# Patient Record
Sex: MALE | Race: WHITE | NOT HISPANIC OR LATINO
[De-identification: names, ages, dates, MRNs, and addresses within clinical notes are randomized per-mention and may not be internally consistent; named-entity substitution may affect disease eponyms.]

---

## 2017-06-12 ENCOUNTER — APPOINTMENT (OUTPATIENT)
Dept: GASTROENTEROLOGY | Facility: CLINIC | Age: 62
End: 2017-06-12

## 2017-06-12 VITALS
DIASTOLIC BLOOD PRESSURE: 86 MMHG | HEIGHT: 72 IN | TEMPERATURE: 98.8 F | WEIGHT: 234 LBS | SYSTOLIC BLOOD PRESSURE: 120 MMHG | BODY MASS INDEX: 31.69 KG/M2

## 2017-06-12 DIAGNOSIS — I71.2 THORACIC AORTIC ANEURYSM, W/OUT RUPTURE: ICD-10-CM

## 2017-06-12 DIAGNOSIS — Z82.49 FAMILY HISTORY OF ISCHEMIC HEART DISEASE AND OTHER DISEASES OF THE CIRCULATORY SYSTEM: ICD-10-CM

## 2017-06-12 DIAGNOSIS — Z82.3 FAMILY HISTORY OF STROKE: ICD-10-CM

## 2017-06-12 DIAGNOSIS — Z82.5 FAMILY HISTORY OF ASTHMA AND OTHER CHRONIC LOWER RESPIRATORY DISEASES: ICD-10-CM

## 2017-06-12 DIAGNOSIS — Z00.00 ENCOUNTER FOR GENERAL ADULT MEDICAL EXAMINATION W/OUT ABNORMAL FINDINGS: ICD-10-CM

## 2017-06-12 DIAGNOSIS — Z80.8 FAMILY HISTORY OF MALIGNANT NEOPLASM OF OTHER ORGANS OR SYSTEMS: ICD-10-CM

## 2017-06-12 DIAGNOSIS — Z87.442 PERSONAL HISTORY OF URINARY CALCULI: ICD-10-CM

## 2017-06-12 DIAGNOSIS — Z86.79 PERSONAL HISTORY OF OTHER DISEASES OF THE CIRCULATORY SYSTEM: ICD-10-CM

## 2017-06-12 DIAGNOSIS — Z87.891 PERSONAL HISTORY OF NICOTINE DEPENDENCE: ICD-10-CM

## 2017-06-12 RX ORDER — RAMIPRIL 10 MG/1
10 CAPSULE ORAL DAILY
Refills: 0 | Status: ACTIVE | COMMUNITY

## 2017-06-12 RX ORDER — CHOLECALCIFEROL (VITAMIN D3) 25 MCG
TABLET ORAL
Refills: 0 | Status: ACTIVE | COMMUNITY

## 2017-07-13 ENCOUNTER — APPOINTMENT (OUTPATIENT)
Dept: GASTROENTEROLOGY | Facility: AMBULATORY MEDICAL SERVICES | Age: 62
End: 2017-07-13

## 2017-08-01 ENCOUNTER — APPOINTMENT (OUTPATIENT)
Dept: GASTROENTEROLOGY | Facility: CLINIC | Age: 62
End: 2017-08-01
Payer: COMMERCIAL

## 2017-08-01 VITALS
TEMPERATURE: 98.6 F | WEIGHT: 235 LBS | HEIGHT: 72 IN | BODY MASS INDEX: 31.83 KG/M2 | DIASTOLIC BLOOD PRESSURE: 90 MMHG | OXYGEN SATURATION: 95 % | HEART RATE: 89 BPM | SYSTOLIC BLOOD PRESSURE: 138 MMHG

## 2017-08-01 PROCEDURE — 99213 OFFICE O/P EST LOW 20 MIN: CPT

## 2018-07-23 PROBLEM — Z80.8 FAMILY HISTORY OF SKIN CANCER: Status: ACTIVE | Noted: 2017-06-12

## 2019-01-11 ENCOUNTER — APPOINTMENT (OUTPATIENT)
Dept: RADIOLOGY | Facility: IMAGING CENTER | Age: 64
End: 2019-01-11
Payer: COMMERCIAL

## 2019-01-11 ENCOUNTER — OUTPATIENT (OUTPATIENT)
Dept: OUTPATIENT SERVICES | Facility: HOSPITAL | Age: 64
LOS: 1 days | End: 2019-01-11
Payer: COMMERCIAL

## 2019-01-11 DIAGNOSIS — Z00.8 ENCOUNTER FOR OTHER GENERAL EXAMINATION: ICD-10-CM

## 2019-01-11 PROCEDURE — 71046 X-RAY EXAM CHEST 2 VIEWS: CPT

## 2019-01-11 PROCEDURE — 71046 X-RAY EXAM CHEST 2 VIEWS: CPT | Mod: 26

## 2019-02-17 ENCOUNTER — OUTPATIENT (OUTPATIENT)
Dept: OUTPATIENT SERVICES | Facility: HOSPITAL | Age: 64
LOS: 1 days | End: 2019-02-17
Payer: COMMERCIAL

## 2019-02-17 ENCOUNTER — APPOINTMENT (OUTPATIENT)
Dept: MRI IMAGING | Facility: IMAGING CENTER | Age: 64
End: 2019-02-17
Payer: COMMERCIAL

## 2019-02-17 DIAGNOSIS — R94.5 ABNORMAL RESULTS OF LIVER FUNCTION STUDIES: ICD-10-CM

## 2019-02-17 PROCEDURE — 74183 MRI ABD W/O CNTR FLWD CNTR: CPT | Mod: 26

## 2019-02-17 PROCEDURE — 74183 MRI ABD W/O CNTR FLWD CNTR: CPT

## 2019-02-17 PROCEDURE — A9585: CPT

## 2019-04-02 ENCOUNTER — LABORATORY RESULT (OUTPATIENT)
Age: 64
End: 2019-04-02

## 2019-04-02 ENCOUNTER — APPOINTMENT (OUTPATIENT)
Dept: GASTROENTEROLOGY | Facility: CLINIC | Age: 64
End: 2019-04-02
Payer: COMMERCIAL

## 2019-04-02 VITALS
HEIGHT: 72 IN | BODY MASS INDEX: 32.91 KG/M2 | SYSTOLIC BLOOD PRESSURE: 126 MMHG | OXYGEN SATURATION: 97 % | WEIGHT: 243 LBS | DIASTOLIC BLOOD PRESSURE: 90 MMHG | HEART RATE: 85 BPM | TEMPERATURE: 97.5 F

## 2019-04-02 DIAGNOSIS — D12.3 BENIGN NEOPLASM OF TRANSVERSE COLON: ICD-10-CM

## 2019-04-02 DIAGNOSIS — R53.83 OTHER FATIGUE: ICD-10-CM

## 2019-04-02 PROCEDURE — 36415 COLL VENOUS BLD VENIPUNCTURE: CPT

## 2019-04-02 PROCEDURE — 99214 OFFICE O/P EST MOD 30 MIN: CPT | Mod: 25

## 2019-04-02 RX ORDER — SODIUM SULFATE, POTASSIUM SULFATE, MAGNESIUM SULFATE 17.5; 3.13; 1.6 G/ML; G/ML; G/ML
17.5-3.13-1.6 SOLUTION, CONCENTRATE ORAL
Qty: 1 | Refills: 0 | Status: DISCONTINUED | COMMUNITY
Start: 2017-06-12 | End: 2019-04-02

## 2019-04-03 NOTE — CONSULT LETTER
[FreeTextEntry1] : Dear Dr. Macias and Dr. Clarence Mike,\par \par I had the pleasure of seeing your patient GUNJAN LOYD in the office today.  My office note is attached.\par \par Thank you very much for allowing me to participate in the care of your patient.\par \par Sincerely,\par \par Ty Pierre M.D., FACG, FACP\par Director, Celiac Program at [facility]\par  of Medicine\par [employer] at [facility] Practice Director,\par [facility] - Gastroenterology/Internal Medicine at [place]\[facility] [address]\[place] [address]\par Tel: [phone]\par Email: [email or web address]

## 2019-04-03 NOTE — ASSESSMENT
[FreeTextEntry1] : Patient with elevated liver tests with an MRI showing fatty liver along with a subcentimeter indeterminate focus. He has a history of colonic polyps.\par \par Blood work was sent for LFTs, PT, alpha-1 antitrypsin, alpha-fetoprotein, SARAI, ANCA, ceruloplasmin, iron studies, GGTP, celiac markers, hepatitis A., B., C. serologies, lipid profile, AMA, anti-smooth muscle antibody, anti-LKM antibody, anti-soluble liver antigen antibody, and AGUILAR Fibrosure.\par \par We will repeat an MRI in August to followup the indeterminate focus seen.\par \par The patient was advised to stay on a low fat and low carbohydrate diet and to try to lose weight and increase exercise.\par \par Patient will return to see me in 2 months.\par \par Patient is due for colonoscopy in July 2020.\par \par \par Plan from 8/1/17 - Patient status post colonoscopy significant for tubular adenoma and mild diverticulosis. He has occasional urgency with his bowel movements but is otherwise well.\par \par Information was given to the patient regarding diverticulosis and a high-fiber diet.\par \par We will repeat a colonoscopy in 3 years that is July 2020.\par \par Patient will contact me if his fecal urgency continues or worsens.

## 2019-04-12 LAB
A1AT SERPL-MCNC: 72 MG/DL
AFP-TM SERPL-MCNC: 2.5 NG/ML
ALBUMIN SERPL ELPH-MCNC: 4.6 G/DL
ALP BLD-CCNC: 71 U/L
ALT SERPL-CCNC: 65 U/L
ANA PAT FLD IF-IMP: ABNORMAL
ANA SER IF-ACNC: ABNORMAL
AST SERPL W P-5'-P-CCNC: 51 IU/L
AST SERPL-CCNC: 45 U/L
BILIRUB DIRECT SERPL-MCNC: 0.2 MG/DL
BILIRUB INDIRECT SERPL-MCNC: 0.5 MG/DL
BILIRUB SERPL-MCNC: 0.7 MG/DL
CERULOPLASMIN SERPL-MCNC: 22 MG/DL
CHOLEST SERPL-MCNC: 182 MG/DL
CHOLEST SERPL-MCNC: 182 MG/DL
CHOLEST/HDLC SERPL: 5.1 RATIO
COMMENT:: NORMAL
FERRITIN SERPL-MCNC: 384 NG/ML
FIBROSIS STAGE SERPL QL: NORMAL
FIBROSURE ALPHA 2 MACROGLOBULINS: 312 MG/DL
FIBROSURE ALT (SGPT): 73 IU/L
FIBROSURE APOLIPOPROTEIN A1: 130 MG/DL
FIBROSURE GGT: 39 IU/L
FIBROSURE HAPTOGLOBIN: 90 MG/DL
FIBROSURE SCORING: NORMAL
FIBROSURE TOTAL BILIRUBIN: 0.6 MG/DL
GGT SERPL-CCNC: 43 U/L
GLIADIN IGA SER QL: <5 UNITS
GLIADIN IGG SER QL: <5 UNITS
GLIADIN PEPTIDE IGA SER-ACNC: NEGATIVE
GLIADIN PEPTIDE IGG SER-ACNC: NEGATIVE
GLUCOSE SERPL-MCNC: 116 MG/DL
HBV CORE IGG+IGM SER QL: NONREACTIVE
HBV SURFACE AB SER QL: NONREACTIVE
HBV SURFACE AG SER QL: NONREACTIVE
HCV AB SER QL: NONREACTIVE
HCV S/CO RATIO: 0.07 S/CO
HDLC SERPL-MCNC: 36 MG/DL
HEPATITIS A IGG ANTIBODY: NONREACTIVE
IGA SER QL IEP: 133 MG/DL
IGG SER QL IEP: 854 MG/DL
INR PPP: 0.97 RATIO
INTERPRETATIONS:: NORMAL
IRON SATN MFR SERPL: 30 %
IRON SERPL-MCNC: 106 UG/DL
LDLC SERPL CALC-MCNC: 81 MG/DL
LIVER FIBR SCORE SERPL CALC.FIBROSURE: 0.65
LKM AB SER QL IF: <20.1 UNITS
MITOCHONDRIA AB SER IF-ACNC: NORMAL
MPO AB + PR3 PNL SER: NORMAL
NASH SCORING: NORMAL
NECROINFLAMMATORY ACT GRADE SERPL QL: NORMAL
NECROINFLAMMATORY ACT SCORE SERPL: 0.75 (ref 0.25–?)
PROT SERPL-MCNC: 7.4 G/DL
PT BLD: 11.2 SEC
SERVICE CMNT-IMP: NORMAL
SMOOTH MUSCLE AB SER QL IF: ABNORMAL
SOLUBLE LIVER IGG SER IA-ACNC: < 20.1 UNITS
STEATOSIS GRADE: NORMAL
STEATOSIS GRADING: NORMAL
STEATOSIS SCORE: 0.73
TIBC SERPL-MCNC: 348 UG/DL
TRIGL SERPL-MCNC: 323 MG/DL
TRIGL SERPL-MCNC: 326 MG/DL
TTG IGA SER IA-ACNC: <1.2 U/ML
TTG IGA SER-ACNC: NEGATIVE
TTG IGG SER IA-ACNC: 7.3 U/ML
TTG IGG SER IA-ACNC: ABNORMAL
UIBC SERPL-MCNC: 242 UG/DL

## 2019-07-23 ENCOUNTER — APPOINTMENT (OUTPATIENT)
Dept: GASTROENTEROLOGY | Facility: CLINIC | Age: 64
End: 2019-07-23
Payer: COMMERCIAL

## 2019-07-23 VITALS
HEIGHT: 72 IN | SYSTOLIC BLOOD PRESSURE: 140 MMHG | TEMPERATURE: 98.7 F | BODY MASS INDEX: 32.78 KG/M2 | HEART RATE: 95 BPM | DIASTOLIC BLOOD PRESSURE: 90 MMHG | WEIGHT: 242 LBS | OXYGEN SATURATION: 95 %

## 2019-07-23 PROCEDURE — 36415 COLL VENOUS BLD VENIPUNCTURE: CPT

## 2019-07-23 PROCEDURE — 99215 OFFICE O/P EST HI 40 MIN: CPT | Mod: 25

## 2019-07-23 NOTE — HISTORY OF PRESENT ILLNESS
[FreeTextEntry1] : We spent a great deal of time going over the patient's blood work from April 2, 2019. AGUILAR Fibrosure showed S3, N2, F3 disease indicating the presence of AGUILAR with significant fibrosis. The patient's AST and ALT were elevated at 45 and 65 respectively. Alpha one antitrypsin was decreased at 72. SARAI was elevated at 1-320 with an anti-smooth muscle antibody that was borderline at 1-20. IgG was normal. Triglyceride was elevated at 323 although this was not a fasting study. Celiac markers were largely negative except for a weakly positive tissue transglutaminase IgG. Previous MRI showed a subcentimeter indeterminate focus in the liver.\par \par The patient denies abdominal pain. His bowel movements are stable. He generally has 2 sour bowel movements a day although he has occasional diarrhea. He denies melena or bright blood per rectum. The patient's weight is stable.\par \par \par Note from 4/2/19 - The patient has a history of colonic polyps and diverticulosis. He reports that he was found to have elevated liver tests and subsequently had an MRI on February 17, 2019 which showed moderate to severe hepatic steatosis along with a subcentimeter indeterminate focus in the right hepatic lobe for which six-month followup was recommended. The patient complains of fatigue but denies abdominal pain, heartburn, dysphagia. Bowel movements are normal without melena or prior blood per rectum. The patient has gained 8 pounds over the past 1-1/2 years.\par \par \par Note from 8/1/17 - The patient is status post colonoscopy performed in July 13, 2017. Examination was significant for removal of a tubular adenoma, along with mild diverticulosis and internal hemorrhoids which are asymptomatic. The patient has 2 bowel movements a day which are usually solid. He does note urgency with loose stools approximately twice a week. He denies melena or bright or blood per rectum. He denies heartburn or dysphagia. His weight is stable. Review of systems is negative.\par \par Note from 6/12/17 - Patient has a history of colonic polyps. He had multiple polyps removed on his previous colonoscopy. He feels well denying abdominal pain or chest pain. His stools are occasionally loose. He denies melena or bright red blood per rectum. Patient does not have heartburn or dysphagia. His weight is stable.\par \par The patient has a 4.7 cm thoracic aneurysm which is followed by cardiology and cardiothoracic surgery. The patient has not been hospitalized in the past year and denies any other cardiac issues.

## 2019-07-23 NOTE — CONSULT LETTER
[FreeTextEntry1] : Dear Dr. Macias and Dr. Clarence Mike,\par \par I had the pleasure of seeing your patient GUNJAN LOYD in the office today.  My office note is attached.\par \par Thank you very much for allowing me to participate in the care of your patient.\par \par Sincerely,\par \par Ty Pierre M.D., FAC, FACP\par Director, Celiac Program at [facility]  of Medicine\par [place] [employer] at [facility] Practice Director,\par [facility] - Gastroenterology/Internal Medicine at [place]\[facility] [address]\[place] [address]\par Tel: [phone]\par Email: [email or web address] \par \par \par The attached note has been created using a voice recognition system ([email or web address]).  There may be some misspellings and typos.  Please call my office if you have any issues or questions.

## 2019-07-23 NOTE — PHYSICAL EXAM
[General Appearance - Alert] : alert [General Appearance - In No Acute Distress] : in no acute distress [Neck Appearance] : the appearance of the neck was normal [Jugular Venous Distention Increased] : there was no jugular-venous distention [Neck Cervical Mass (___cm)] : no neck mass was observed [Thyroid Diffuse Enlargement] : the thyroid was not enlarged [Thyroid Nodule] : there were no palpable thyroid nodules [Auscultation Breath Sounds / Voice Sounds] : lungs were clear to auscultation bilaterally [Heart Rate And Rhythm] : heart rate was normal and rhythm regular [Heart Sounds Gallop] : no gallops [Heart Sounds] : normal S1 and S2 [Murmurs] : no murmurs [Heart Sounds Pericardial Friction Rub] : no pericardial rub [Abdomen Soft] : soft [Bowel Sounds] : normal bowel sounds [Edema] : there was no peripheral edema [] : no hepato-splenomegaly [Abdomen Tenderness] : non-tender [Abdomen Mass (___ Cm)] : no abdominal mass palpated [No CVA Tenderness] : no ~M costovertebral angle tenderness [Oriented To Time, Place, And Person] : oriented to person, place, and time [No Spinal Tenderness] : no spinal tenderness [Impaired Insight] : insight and judgment were intact [Affect] : the affect was normal

## 2019-07-23 NOTE — ASSESSMENT
[FreeTextEntry1] : Patient with evidence of AGUILAR and significant fibrosis. He has elevated transaminases. He has an elevated SARIA with a borderline anti-smooth muscle antibody and normal IgG. It is unlikely that this represents autoimmune hepatitis although it needs to be considered. The patient has a decreased alpha-1 antitrypsin level. Again, it is unlikely that the patient has alpha-1 antitrypsin deficiency as a cause of liver disease but it needs to be considered. The patient has a weakly positive tissue transglutaminase IgG but otherwise negative celiac studies. This is not consistent with the diagnosis of celiac disease. The patient also has elevated triglycerides. Previous MRI showed a subcentimeter indeterminant focus in the liver.\par \par Blood work was sent for one antitrypsin phenotype and genotype, celiac genetic testing, repeat SARAI, AFP, LFTs, GGTP.\par \par Patient was sent for an MRI to followup the previously seen indeterminant focus.\par \par Given the possibility of cirrhosis or advanced fibrosis and the slight questions as to any other liver diseases including autoimmune hepatitis and alpha-1 antitrypsin deficiency, the patient will be referred for a hepatology evaluation. He will need a fibroscan, which they will be able to perform.\par \par The patient was advised to stay on a low fat and low carbohydrate diet and to try to lose weight and increase exercise, as well as to avoid alcohol.\par \par The patient will discuss with his PMD regarding the possibility of starting medication for the elevated triglycerides. This has been discussed in the past.\par \par Patient is due for a colonoscopy in July 2020 for his history of colonic polyps. I will perform an EGD at the same time to fully exclude celiac disease.\par \par Patient will return to see me in 3 months.\par \par \par Plan from 4/2/19 - Patient with elevated liver tests with an MRI showing fatty liver along with a subcentimeter indeterminate focus. He has a history of colonic polyps.\par \par Blood work was sent for LFTs, PT, alpha-1 antitrypsin, alpha-fetoprotein, SARAI, ANCA, ceruloplasmin, iron studies, GGTP, celiac markers, hepatitis A., B., C. serologies, lipid profile, AMA, anti-smooth muscle antibody, anti-LKM antibody, anti-soluble liver antigen antibody, and AGUILAR Fibrosure.\par \par We will repeat an MRI in August to followup the indeterminate focus seen.\par \par The patient was advised to stay on a low fat and low carbohydrate diet and to try to lose weight and increase exercise.\par \par Patient will return to see me in 2 months.\par \par Patient is due for colonoscopy in July 2020.\par \par \par Plan from 8/1/17 - Patient status post colonoscopy significant for tubular adenoma and mild diverticulosis. He has occasional urgency with his bowel movements but is otherwise well.\par \par Information was given to the patient regarding diverticulosis and a high-fiber diet.\par \par We will repeat a colonoscopy in 3 years that is July 2020.\par \par Patient will contact me if his fecal urgency continues or worsens.

## 2019-08-02 LAB
A1AT PHENOTYP SERPL-IMP: NORMAL BANDS
A1AT SERPL-MCNC: 70 MG/DL
A1AT SERPL-MCNC: 72 MG/DL
AFP-TM SERPL-MCNC: 2.4 NG/ML
ALBUMIN SERPL ELPH-MCNC: 5 G/DL
ALP BLD-CCNC: 66 U/L
ALT SERPL-CCNC: 54 U/L
ANA PAT FLD IF-IMP: ABNORMAL
ANA SER IF-ACNC: ABNORMAL
AST SERPL-CCNC: 30 U/L
BILIRUB DIRECT SERPL-MCNC: 0.2 MG/DL
BILIRUB INDIRECT SERPL-MCNC: 0.4 MG/DL
BILIRUB SERPL-MCNC: 0.6 MG/DL
CELIAC GENETICS PROMETHEUS: NORMAL
GGT SERPL-CCNC: 42 U/L
PROT SERPL-MCNC: 7.4 G/DL
SERPINA1 GENE MUT TESTED BLD/T: NORMAL

## 2019-08-08 ENCOUNTER — APPOINTMENT (OUTPATIENT)
Dept: HEPATOLOGY | Facility: CLINIC | Age: 64
End: 2019-08-08
Payer: COMMERCIAL

## 2019-08-08 ENCOUNTER — MESSAGE (OUTPATIENT)
Age: 64
End: 2019-08-08

## 2019-08-08 VITALS
BODY MASS INDEX: 32.78 KG/M2 | DIASTOLIC BLOOD PRESSURE: 90 MMHG | HEIGHT: 72 IN | RESPIRATION RATE: 18 BRPM | SYSTOLIC BLOOD PRESSURE: 138 MMHG | HEART RATE: 63 BPM | WEIGHT: 242 LBS | TEMPERATURE: 97.9 F

## 2019-08-08 PROCEDURE — ZZZZZ: CPT

## 2019-08-08 PROCEDURE — 91200 LIVER ELASTOGRAPHY: CPT

## 2019-08-08 PROCEDURE — 99215 OFFICE O/P EST HI 40 MIN: CPT | Mod: 25

## 2019-08-08 PROCEDURE — 99205 OFFICE O/P NEW HI 60 MIN: CPT | Mod: 25

## 2019-08-08 RX ORDER — PSYLLIUM HUSK 0.4 G
CAPSULE ORAL
Refills: 0 | Status: DISCONTINUED | COMMUNITY
End: 2019-08-08

## 2019-08-08 NOTE — PHYSICAL EXAM
[General Appearance - Alert] : alert [Sclera] : the sclera and conjunctiva were normal [Outer Ear] : the ears and nose were normal in appearance [Neck Appearance] : the appearance of the neck was normal [Apical Impulse] : the apical impulse was normal [Heart Rate And Rhythm] : heart rate was normal and rhythm regular [Heart Sounds Gallop] : no gallops [Heart Sounds] : normal S1 and S2 [Heart Sounds Pericardial Friction Rub] : no pericardial rub [Murmurs] : no murmurs [Arterial Pulses Carotid] : carotid pulses were normal with no bruits [Bowel Sounds] : normal bowel sounds [Abdomen Tenderness] : non-tender [Abdomen Soft] : soft [] : no hepato-splenomegaly [Abdomen Mass (___ Cm)] : no abdominal mass palpated [Abdomen Hernia] : no hernia was discovered [Abnormal Walk] : normal gait [Skin Color & Pigmentation] : normal skin color and pigmentation [Cranial Nerves] : cranial nerves 2-12 were intact [Sensation] : the sensory exam was normal to light touch and pinprick [Motor Exam] : the motor exam was normal [No Focal Deficits] : no focal deficits [Oriented To Time, Place, And Person] : oriented to person, place, and time [Affect] : the affect was normal

## 2019-08-09 ENCOUNTER — OTHER (OUTPATIENT)
Age: 64
End: 2019-08-09

## 2019-08-09 NOTE — ASSESSMENT
[FreeTextEntry1] : 63 years old male with obesity, fatty liver, elevated LFTs, advanced fibrosis on blood tests but Fibroscan shows no significant fibrosis, heterozygous state for alpha-antitrypsin deficiency, and an indeterminate lesion in the liver\par \par PLAN\par \par -Patient is scheduled for a MRI in the coming sunday through Dr. Pierre for follow up evaluation of the inderminate lesion in the liver. I have requested Dr. Pierre to modify to accommodate MR elastography. Patient is reluctant for a liver biopsy now.\par -I have recommended vaccination for hepatitis A, and B. Rational and potential side effects were explained. \par -I have discussed with him at length regarding nonalcoholic fatty liver disease (NAFLD). I reviewed the natural history, evaluation and staging of the disease including FibroScan or MR elastography and potential need for liver biopsy, and prognosis, including possible risks of development of compensated cirrhosis, decompensated cirrhosis, and HCC.\par I have discussed with him that lifestyle modifications are crucial for management of NAFLD. I recommended gradual weight loss of 5-10% of his body weight through dietary changes and moderate intensity exercise for 20 minutes at least 3 times per week. These changes have been shown to lead to regression or even resolution of steatosis, inflammation, and even fibrosis in some patients.\par I have reviewed with him the fact that currently, there are no FDA-approved pharmacologic treatments for NAFLD, but that this may change within the next 1-2 years as a number of promising drugs are currently being investigated in clinical trials. We have discussed the possibility of clinical trial referral/enrollment.\par \par \par RTC in 1 month.\par

## 2019-08-09 NOTE — HISTORY OF PRESENT ILLNESS
[FreeTextEntry1] : 63 years old male with suspected NAFLD/AGUILAR, seen in consultation with Dr. Pierre. \par \par He has been extensively evaluated for elevated LFTs, and the tests results are appended in the current note. \par \par The patient has a history of colonic polyps and diverticulosis, long hx of fatty liver.\par \par He reports that he was found to have elevated liver tests and subsequently had an MRI on February 17, 2019 which showed moderate to severe hepatic steatosis along with a subcentimeter indeterminate focus in the right hepatic lobe for which six-month followup was recommended. Moderate steatosis with fat fraction of 18% was noted.\par \par Blood work from April 2, 2019 shows a AGUILAR Fibrosure showed S3, N2, F3 disease indicating the presence of AGUILAR with significant fibrosis.  He however had a Fibroscan done in the office which showed a median fibrosis score of 5.6 suggesting no significant fibrosis.\par \par The patient's AST and ALT were elevated at 45 and 65 respectively. Alpha one antitrypsin was decreased at 72.  Heterozygous M and z form. \par \par SARAI was elevated at 1-320 with an anti-smooth muscle antibody that was borderline at 1-20. IgG was normal. Triglyceride was elevated at 323 although this was not a fasting study. Celiac markers were largely negative except for a weakly positive tissue transglutaminase IgG. Previous MRI showed a subcentimeter indeterminate focus in the liver.\par \par His AFP level is 2.4. \par \par Viral serology is negative for hepatitis A, B, and C. He does not have immunity towards hepatitis A and B, and will need vaccination. \par \par The patient is status post colonoscopy performed in July 13, 2017. Examination was significant for removal of a tubular adenoma, along with mild diverticulosis and internal hemorrhoids which are asymptomatic. \par \par The patient has a 4.7 cm thoracic aneurysm which is followed by cardiology and cardiothoracic surgery. The patient has not been hospitalized in the past year and denies any other cardiac issues.

## 2019-08-11 ENCOUNTER — APPOINTMENT (OUTPATIENT)
Dept: MRI IMAGING | Facility: IMAGING CENTER | Age: 64
End: 2019-08-11

## 2019-08-27 ENCOUNTER — FORM ENCOUNTER (OUTPATIENT)
Age: 64
End: 2019-08-27

## 2019-08-28 ENCOUNTER — OUTPATIENT (OUTPATIENT)
Dept: OUTPATIENT SERVICES | Facility: HOSPITAL | Age: 64
LOS: 1 days | End: 2019-08-28
Payer: COMMERCIAL

## 2019-08-28 ENCOUNTER — APPOINTMENT (OUTPATIENT)
Dept: MRI IMAGING | Facility: IMAGING CENTER | Age: 64
End: 2019-08-28
Payer: COMMERCIAL

## 2019-08-28 DIAGNOSIS — R74.8 ABNORMAL LEVELS OF OTHER SERUM ENZYMES: ICD-10-CM

## 2019-08-28 DIAGNOSIS — R93.5 ABNORMAL FINDINGS ON DIAGNOSTIC IMAGING OF OTHER ABDOMINAL REGIONS, INCLUDING RETROPERITONEUM: ICD-10-CM

## 2019-08-28 PROCEDURE — 74183 MRI ABD W/O CNTR FLWD CNTR: CPT

## 2019-08-28 PROCEDURE — 76391 MR ELASTOGRAPHY: CPT

## 2019-08-28 PROCEDURE — 76391 MR ELASTOGRAPHY: CPT | Mod: 26

## 2019-08-28 PROCEDURE — A9585: CPT

## 2019-08-28 PROCEDURE — 74183 MRI ABD W/O CNTR FLWD CNTR: CPT | Mod: 26

## 2019-09-17 ENCOUNTER — APPOINTMENT (OUTPATIENT)
Dept: HEPATOLOGY | Facility: CLINIC | Age: 64
End: 2019-09-17

## 2019-09-23 ENCOUNTER — APPOINTMENT (OUTPATIENT)
Dept: HEPATOLOGY | Facility: CLINIC | Age: 64
End: 2019-09-23
Payer: COMMERCIAL

## 2019-09-23 VITALS
WEIGHT: 245 LBS | SYSTOLIC BLOOD PRESSURE: 150 MMHG | HEART RATE: 86 BPM | BODY MASS INDEX: 33.18 KG/M2 | TEMPERATURE: 98 F | RESPIRATION RATE: 18 BRPM | DIASTOLIC BLOOD PRESSURE: 95 MMHG | HEIGHT: 72 IN

## 2019-09-23 DIAGNOSIS — K76.0 FATTY (CHANGE OF) LIVER, NOT ELSEWHERE CLASSIFIED: ICD-10-CM

## 2019-09-23 PROCEDURE — 99214 OFFICE O/P EST MOD 30 MIN: CPT

## 2019-09-27 PROBLEM — K76.0 FATTY LIVER: Status: ACTIVE | Noted: 2019-04-02

## 2019-09-27 NOTE — ASSESSMENT
[FreeTextEntry1] : 63 years old male with obesity, fatty liver, elevated LFTs, advanced fibrosis on blood tests but Fibroscan shows no significant fibrosis, heterozygous state for alpha-antitrypsin deficiency, and an indeterminate lesion in the liver.  He has a positive SARAI titer ( 1:320 to 1:80), weakly positive SMA, and an inderminate atypical ANCA. Also has a weakly positive TTG. I suspect patient likely has AGUILAR, and the positive autoantibodies are non-specific bystanders. We will review need for biopsy on follow up. At this tiem I have discussed management with life style changes with goal for weight loss, and diet control.\par MRI showed-normal morphology of the liver, and sub cm lesion in the right lobe of the liver with delayed enhancement that is stable over the last 6 months. A benign etiology is favored. Moderate to sever steatosis, but no fibrosis was noted.\par \par PLAN\par -I have recommended vaccination for hepatitis A, and B. Rational and potential side effects were explained. Patient however declined vaccination.\par -I have discussed with him at length regarding nonalcoholic fatty liver disease (NAFLD). I reviewed the natural history, evaluation and staging of the disease including FibroScan or MR elastography and potential need for liver biopsy, and prognosis, including possible risks of development of compensated cirrhosis, decompensated cirrhosis, and HCC on follow up.\par I have discussed with him that lifestyle modifications are crucial for management of NAFLD. I recommended gradual weight loss of 5-10% of his body weight through dietary changes and moderate intensity exercise for 20 minutes at least 3 times per week. These changes have been shown to lead to regression or even resolution of steatosis, inflammation, and even fibrosis in some patients.\par I have reviewed with him the fact that currently, there are no FDA-approved pharmacologic treatments for NAFLD, but that this may change within the next 1-2 years as a number of promising drugs are currently being investigated in clinical trials. We have discussed the possibility of clinical trial referral/enrollment.\par -I have recommended him OTC Vitamin E 400 IU BID for the treatment of AGUILAR\par \par RTC in 1 month.\par

## 2019-09-27 NOTE — PHYSICAL EXAM
[General Appearance - Alert] : alert [Sclera] : the sclera and conjunctiva were normal [Outer Ear] : the ears and nose were normal in appearance [Neck Appearance] : the appearance of the neck was normal [Apical Impulse] : the apical impulse was normal [Heart Rate And Rhythm] : heart rate was normal and rhythm regular [Heart Sounds] : normal S1 and S2 [Murmurs] : no murmurs [Heart Sounds Gallop] : no gallops [Bowel Sounds] : normal bowel sounds [Arterial Pulses Carotid] : carotid pulses were normal with no bruits [Heart Sounds Pericardial Friction Rub] : no pericardial rub [Abdomen Tenderness] : non-tender [Abdomen Soft] : soft [] : no hepato-splenomegaly [Abdomen Mass (___ Cm)] : no abdominal mass palpated [Abdomen Hernia] : no hernia was discovered [Skin Color & Pigmentation] : normal skin color and pigmentation [Abnormal Walk] : normal gait [Sensation] : the sensory exam was normal to light touch and pinprick [Cranial Nerves] : cranial nerves 2-12 were intact [No Focal Deficits] : no focal deficits [Motor Exam] : the motor exam was normal [Affect] : the affect was normal [Oriented To Time, Place, And Person] : oriented to person, place, and time

## 2019-09-27 NOTE — HISTORY OF PRESENT ILLNESS
[FreeTextEntry1] : 64 years old male with suspected NAFLD/AGUILAR, seen in consultation with Dr. Pierre.  The patient has a history of colonic polyps and diverticulosis, long hx of fatty liver. He has been extensively evaluated by Dr. Pierre and the results are summarized underneath.\par \par He reports that he was found to have elevated liver tests and subsequently had an MRI on February 17, 2019 which showed moderate to severe hepatic steatosis along with a subcentimeter indeterminate focus in the right hepatic lobe for which six-month followup was recommended. Moderate steatosis with fat fraction of 18% was noted.\par \par Blood work from April 2, 2019 shows a AGUILAR Fibrosure showed S3, N2, F3 disease indicating the presence of AGUILAR with significant fibrosis. He however had a Fibroscan done in the office which showed a median fibrosis score of 5.6 suggesting no significant fibrosis.\par \par The patient's AST and ALT were elevated at 45 and 65 respectively. Alpha one antitrypsin was decreased at 72. Heterozygous M and z form. \par \par SARAI was elevated at 1-320 with an anti-smooth muscle antibody that was borderline at 1-20. IgG was normal. Triglyceride was elevated at 323 although this was not a fasting study. Celiac markers were largely negative except for a weakly positive tissue transglutaminase IgG. Previous MRI showed a subcentimeter indeterminate focus in the liver.\par \par His AFP level is 2.4. \par \par Viral serology is negative for hepatitis A, B, and C. He does not have immunity towards hepatitis A and B, and will need vaccination. \par \par The patient is status post colonoscopy performed in July 13, 2017. Examination was significant for removal of a tubular adenoma, along with mild diverticulosis and internal hemorrhoids which are asymptomatic. \par \par The patient has a 4.7 cm thoracic aneurysm which is followed by cardiology and cardiothoracic surgery. The patient has not been hospitalized in the past year and denies any other cardiac issues. \par \par Interval hx: 9-\par \par Patient presents for follow up. He reports doing well, and has no new complaints. He was recommended vaccination for hepatitis A, and B, and he declined. Hi repeat SARAI tire is 1:80. \par MRI showed-normal morphology of the liver, and sub cm lesion in the right lobe of the liver with delayed enhancement that is stable over the last 6 months. A benign etiology is favored. Moderate to sever steatosis, but no fibrosis was noted.

## 2020-05-27 ENCOUNTER — OUTPATIENT (OUTPATIENT)
Dept: OUTPATIENT SERVICES | Facility: HOSPITAL | Age: 65
LOS: 1 days | End: 2020-05-27
Payer: COMMERCIAL

## 2020-05-27 ENCOUNTER — APPOINTMENT (OUTPATIENT)
Dept: CT IMAGING | Facility: IMAGING CENTER | Age: 65
End: 2020-05-27
Payer: COMMERCIAL

## 2020-05-27 DIAGNOSIS — R10.9 UNSPECIFIED ABDOMINAL PAIN: ICD-10-CM

## 2020-05-27 PROCEDURE — 74176 CT ABD & PELVIS W/O CONTRAST: CPT | Mod: 26

## 2020-05-27 PROCEDURE — 74176 CT ABD & PELVIS W/O CONTRAST: CPT

## 2020-07-09 ENCOUNTER — APPOINTMENT (OUTPATIENT)
Dept: MRI IMAGING | Facility: IMAGING CENTER | Age: 65
End: 2020-07-09
Payer: COMMERCIAL

## 2020-07-09 ENCOUNTER — OUTPATIENT (OUTPATIENT)
Dept: OUTPATIENT SERVICES | Facility: HOSPITAL | Age: 65
LOS: 1 days | End: 2020-07-09
Payer: COMMERCIAL

## 2020-07-09 DIAGNOSIS — Z00.8 ENCOUNTER FOR OTHER GENERAL EXAMINATION: ICD-10-CM

## 2020-07-09 PROCEDURE — 72148 MRI LUMBAR SPINE W/O DYE: CPT | Mod: 26

## 2020-07-09 PROCEDURE — 72148 MRI LUMBAR SPINE W/O DYE: CPT

## 2020-08-07 ENCOUNTER — APPOINTMENT (OUTPATIENT)
Age: 65
End: 2020-08-07

## 2020-11-16 ENCOUNTER — APPOINTMENT (OUTPATIENT)
Dept: NUCLEAR MEDICINE | Facility: IMAGING CENTER | Age: 65
End: 2020-11-16
Payer: COMMERCIAL

## 2020-11-16 ENCOUNTER — OUTPATIENT (OUTPATIENT)
Dept: OUTPATIENT SERVICES | Facility: HOSPITAL | Age: 65
LOS: 1 days | End: 2020-11-16
Payer: COMMERCIAL

## 2020-11-16 DIAGNOSIS — Z00.8 ENCOUNTER FOR OTHER GENERAL EXAMINATION: ICD-10-CM

## 2020-11-16 PROCEDURE — 78306 BONE IMAGING WHOLE BODY: CPT | Mod: 26

## 2020-11-16 PROCEDURE — 78306 BONE IMAGING WHOLE BODY: CPT

## 2020-11-16 PROCEDURE — 78830 RP LOCLZJ TUM SPECT W/CT 1: CPT

## 2020-11-16 PROCEDURE — A9561: CPT

## 2020-11-16 PROCEDURE — 78830 RP LOCLZJ TUM SPECT W/CT 1: CPT | Mod: 26

## 2021-06-01 ENCOUNTER — APPOINTMENT (OUTPATIENT)
Dept: GASTROENTEROLOGY | Facility: CLINIC | Age: 66
End: 2021-06-01
Payer: COMMERCIAL

## 2021-06-01 ENCOUNTER — LABORATORY RESULT (OUTPATIENT)
Age: 66
End: 2021-06-01

## 2021-06-01 VITALS
TEMPERATURE: 96.9 F | DIASTOLIC BLOOD PRESSURE: 90 MMHG | SYSTOLIC BLOOD PRESSURE: 130 MMHG | BODY MASS INDEX: 33.32 KG/M2 | WEIGHT: 246 LBS | HEART RATE: 91 BPM | OXYGEN SATURATION: 95 % | HEIGHT: 72 IN

## 2021-06-01 DIAGNOSIS — R89.4 ABNORMAL IMMUNOLOGICAL FINDINGS IN SPECIMENS FROM OTHER ORGANS, SYSTEMS AND TISSUES: ICD-10-CM

## 2021-06-01 DIAGNOSIS — R19.5 OTHER FECAL ABNORMALITIES: ICD-10-CM

## 2021-06-01 DIAGNOSIS — R93.5 ABNORMAL FINDINGS ON DIAGNOSTIC IMAGING OF OTHER ABDOMINAL REGIONS, INCLUDING RETROPERITONEUM: ICD-10-CM

## 2021-06-01 PROCEDURE — 99214 OFFICE O/P EST MOD 30 MIN: CPT | Mod: 25

## 2021-06-01 RX ORDER — HYDROCHLOROTHIAZIDE 12.5 MG/1
TABLET ORAL
Refills: 0 | Status: ACTIVE | COMMUNITY

## 2021-06-01 RX ORDER — B-12 KIT 1000MCG/ML
1000 KIT INTRAMUSCULAR; SUBCUTANEOUS; TOPICAL
Refills: 0 | Status: DISCONTINUED | COMMUNITY
End: 2021-06-01

## 2021-06-02 DIAGNOSIS — Z20.822 CONTACT WITH AND (SUSPECTED) EXPOSURE TO COVID-19: ICD-10-CM

## 2021-06-02 DIAGNOSIS — Z01.818 ENCOUNTER FOR OTHER PREPROCEDURAL EXAMINATION: ICD-10-CM

## 2021-06-02 NOTE — HISTORY OF PRESENT ILLNESS
[FreeTextEntry1] : The patient has a history of Aguilar with fibrosis and a history of a subcentimeter focus in the liver on MRI performed on August 28th 2019 for which 1 year follow-up was recommended.  He saw the hematologist at that time but has not followed up and does not wish to return.  He has a history of adenomatous colonic polyps.  He also had a history of a weakly positive tissue transglutaminase IgG, which is unlikely to represent celiac disease with otherwise negative labs.  The patient complains of increased gas.  He has 3-4 bowel movements a day which are loose.  He denies melena, bright red blood per rectum, abdominal pain, heartburn, dysphagia.  The patient's weight is stable.  He has a history of a 4.7 cm thoracic aneurysm.  He also recently had a tick bite and is concerned regarding Lyme disease.  The patient has not been admitted to the hospital in the past year and denies any cardiac issues.\par \par \par Note from 7/23/2019 - We spent a great deal of time going over the patient's blood work from April 2, 2019. AGUILAR Fibrosure showed S3, N2, F3 disease indicating the presence of AGUILAR with significant fibrosis. The patient's AST and ALT were elevated at 45 and 65 respectively. Alpha one antitrypsin was decreased at 72. SARAI was elevated at 1-320 with an anti-smooth muscle antibody that was borderline at 1-20. IgG was normal. Triglyceride was elevated at 323 although this was not a fasting study. Celiac markers were largely negative except for a weakly positive tissue transglutaminase IgG. Previous MRI showed a subcentimeter indeterminate focus in the liver.\par \par The patient denies abdominal pain. His bowel movements are stable. He generally has 2 sour bowel movements a day although he has occasional diarrhea. He denies melena or bright blood per rectum. The patient's weight is stable.\par \par \par Note from 4/2/19 - The patient has a history of colonic polyps and diverticulosis. He reports that he was found to have elevated liver tests and subsequently had an MRI on February 17, 2019 which showed moderate to severe hepatic steatosis along with a subcentimeter indeterminate focus in the right hepatic lobe for which six-month followup was recommended. The patient complains of fatigue but denies abdominal pain, heartburn, dysphagia. Bowel movements are normal without melena or prior blood per rectum. The patient has gained 8 pounds over the past 1-1/2 years.\par \par \par Note from 8/1/17 - The patient is status post colonoscopy performed in July 13, 2017. Examination was significant for removal of a tubular adenoma, along with mild diverticulosis and internal hemorrhoids which are asymptomatic. The patient has 2 bowel movements a day which are usually solid. He does note urgency with loose stools approximately twice a week. He denies melena or bright or blood per rectum. He denies heartburn or dysphagia. His weight is stable. Review of systems is negative.\par \par Note from 6/12/17 - Patient has a history of colonic polyps. He had multiple polyps removed on his previous colonoscopy. He feels well denying abdominal pain or chest pain. His stools are occasionally loose. He denies melena or bright red blood per rectum. Patient does not have heartburn or dysphagia. His weight is stable.\par \par The patient has a 4.7 cm thoracic aneurysm which is followed by cardiology and cardiothoracic surgery. The patient has not been hospitalized in the past year and denies any other cardiac issues.

## 2021-06-02 NOTE — REASON FOR VISIT
[FreeTextEntry1] : Cole, liver lesion, abnormal MRI, history of adenomatous colonic polyps, loose stools, abnormal celiac antibody

## 2021-06-02 NOTE — ASSESSMENT
[FreeTextEntry1] : Patient with a history of Aguilar with fibrosis and a subcentimeter focus on previous MRI that requires follow-up.  He has a history of adenomatous colonic polyps.  He complains of excessive gas.  He has a history of a weakly positive tissue transglutaminase IgG.\par \par Blood work was sent for LFTs, PT, alpha-fetoprotein, Aguilar FibroSure testing, and Lyme antibodies.\par \par Patient was sent for MRI with elastography.\par \par I advised follow-up with the hepatology team although I have not certain he will return to see them.\par \par An EGD and colonoscopy have been scheduled. The risks, benefits, alternatives, and limitations of the procedures, including the possibility of missed lesions, were explained.  The patient will require cardiac and anesthesia clearance given the thoracic aneurysm.\par \par \par Plan from 7/23/2019 - Patient with evidence of AGUILAR and significant fibrosis. He has elevated transaminases. He has an elevated SARAI with a borderline anti-smooth muscle antibody and normal IgG. It is unlikely that this represents autoimmune hepatitis although it needs to be considered. The patient has a decreased alpha-1 antitrypsin level. Again, it is unlikely that the patient has alpha-1 antitrypsin deficiency as a cause of liver disease but it needs to be considered. The patient has a weakly positive tissue transglutaminase IgG but otherwise negative celiac studies. This is not consistent with the diagnosis of celiac disease. The patient also has elevated triglycerides. Previous MRI showed a subcentimeter indeterminant focus in the liver.\par \par Blood work was sent for one antitrypsin phenotype and genotype, celiac genetic testing, repeat SARAI, AFP, LFTs, GGTP.\par \par Patient was sent for an MRI to followup the previously seen indeterminant focus.\par \par Given the possibility of cirrhosis or advanced fibrosis and the slight questions as to any other liver diseases including autoimmune hepatitis and alpha-1 antitrypsin deficiency, the patient will be referred for a hepatology evaluation. He will need a fibroscan, which they will be able to perform.\par \par The patient was advised to stay on a low fat and low carbohydrate diet and to try to lose weight and increase exercise, as well as to avoid alcohol.\par \par The patient will discuss with his PMD regarding the possibility of starting medication for the elevated triglycerides. This has been discussed in the past.\par \par Patient is due for a colonoscopy in July 2020 for his history of colonic polyps. I will perform an EGD at the same time to fully exclude celiac disease.\par \par Patient will return to see me in 3 months.\par \par \par Plan from 4/2/19 - Patient with elevated liver tests with an MRI showing fatty liver along with a subcentimeter indeterminate focus. He has a history of colonic polyps.\par \par Blood work was sent for LFTs, PT, alpha-1 antitrypsin, alpha-fetoprotein, SARAI, ANCA, ceruloplasmin, iron studies, GGTP, celiac markers, hepatitis A., B., C. serologies, lipid profile, AMA, anti-smooth muscle antibody, anti-LKM antibody, anti-soluble liver antigen antibody, and AGUILAR Fibrosure.\par \par We will repeat an MRI in August to followup the indeterminate focus seen.\par \par The patient was advised to stay on a low fat and low carbohydrate diet and to try to lose weight and increase exercise.\par \par Patient will return to see me in 2 months.\par \par Patient is due for colonoscopy in July 2020.\par \par \par Plan from 8/1/17 - Patient status post colonoscopy significant for tubular adenoma and mild diverticulosis. He has occasional urgency with his bowel movements but is otherwise well.\par \par Information was given to the patient regarding diverticulosis and a high-fiber diet.\par \par We will repeat a colonoscopy in 3 years that is July 2020.\par \par Patient will contact me if his fecal urgency continues or worsens.

## 2021-06-02 NOTE — CONSULT LETTER
[FreeTextEntry1] : Dear Dr. Macias and Dr. Clarence Mike,\par \par I had the pleasure of seeing your patient GUNJAN LOYD in the office today.  My office note is attached. PLEASE READ THE "ASSESSMENT" SECTION OF THE NOTE TO SEE MY IMPRESSION AND PLAN.\par \par Thank you very much for allowing me to participate in the care of your patient.\par \[facility] Sincerely,\[facility] \par Ty Pierre M.D., FACG, FACP\[facility] Director, Celiac Program at [facility]  of Medicine\[facility] [employer] at [facility] Practice Director,[facility] - Gastroenterology/Internal Medicine at [place]\[address]\[place] [address]\par Tel: [phone]\[facility] Email: [email or web address] \[facility] \[facility] The attached note has been created using a voice recognition system (Dragon).  There may be some misspellings and typos.  Please call my office if you have any issues or questions.

## 2021-06-17 LAB
AFP-TM SERPL-MCNC: 3.2 NG/ML
ALBUMIN SERPL ELPH-MCNC: 5.2 G/DL
ALP BLD-CCNC: 92 U/L
ALT SERPL-CCNC: 74 U/L
AST SERPL W P-5'-P-CCNC: 51 IU/L
AST SERPL-CCNC: 46 U/L
BILIRUB DIRECT SERPL-MCNC: 0.2 MG/DL
BILIRUB INDIRECT SERPL-MCNC: 0.4 MG/DL
BILIRUB SERPL-MCNC: 0.6 MG/DL
CHOLEST SERPL-MCNC: 175 MG/DL
COMMENT:: NORMAL
FIBROSIS STAGE SERPL QL: NORMAL
FIBROSURE ALPHA 2 MACROGLOBULINS: 353 MG/DL
FIBROSURE ALT (SGPT): 82 IU/L
FIBROSURE APOLIPOPROTEIN A1: 152 MG/DL
FIBROSURE GGT: 49 IU/L
FIBROSURE HAPTOGLOBIN: 113 MG/DL
FIBROSURE SCORING: NORMAL
FIBROSURE TOTAL BILIRUBIN: 0.5 MG/DL
GGT SERPL-CCNC: 50 U/L
GLUCOSE SERPL-MCNC: 98 MG/DL
INTERPRETATIONS:: NORMAL
LIVER FIBR SCORE SERPL CALC.FIBROSURE: 0.62
NASH SCORING: NORMAL
NECROINFLAMMATORY ACT GRADE SERPL QL: NORMAL
NECROINFLAMMATORY ACT SCORE SERPL: 0.75 (ref 0.25–?)
PROT SERPL-MCNC: 7.5 G/DL
SERVICE CMNT-IMP: NORMAL
STEATOSIS GRADE: NORMAL
STEATOSIS GRADING: NORMAL
STEATOSIS SCORE: 0.7
TRIGL SERPL-MCNC: 405 MG/DL

## 2021-06-18 ENCOUNTER — NON-APPOINTMENT (OUTPATIENT)
Age: 66
End: 2021-06-18

## 2021-06-30 ENCOUNTER — OUTPATIENT (OUTPATIENT)
Dept: OUTPATIENT SERVICES | Facility: HOSPITAL | Age: 66
LOS: 1 days | End: 2021-06-30
Payer: COMMERCIAL

## 2021-06-30 ENCOUNTER — APPOINTMENT (OUTPATIENT)
Dept: MRI IMAGING | Facility: IMAGING CENTER | Age: 66
End: 2021-06-30
Payer: COMMERCIAL

## 2021-06-30 DIAGNOSIS — R93.5 ABNORMAL FINDINGS ON DIAGNOSTIC IMAGING OF OTHER ABDOMINAL REGIONS, INCLUDING RETROPERITONEUM: ICD-10-CM

## 2021-06-30 DIAGNOSIS — Z00.8 ENCOUNTER FOR OTHER GENERAL EXAMINATION: ICD-10-CM

## 2021-06-30 PROCEDURE — 74183 MRI ABD W/O CNTR FLWD CNTR: CPT

## 2021-06-30 PROCEDURE — 76391 MR ELASTOGRAPHY: CPT

## 2021-06-30 PROCEDURE — 74183 MRI ABD W/O CNTR FLWD CNTR: CPT | Mod: 26

## 2021-06-30 PROCEDURE — 76391 MR ELASTOGRAPHY: CPT | Mod: 26

## 2021-07-09 LAB — SARS-COV-2 N GENE NPH QL NAA+PROBE: NOT DETECTED

## 2021-07-12 ENCOUNTER — APPOINTMENT (OUTPATIENT)
Dept: GASTROENTEROLOGY | Facility: AMBULATORY MEDICAL SERVICES | Age: 66
End: 2021-07-12
Payer: COMMERCIAL

## 2021-07-12 ENCOUNTER — NON-APPOINTMENT (OUTPATIENT)
Age: 66
End: 2021-07-12

## 2021-07-12 PROCEDURE — 45380 COLONOSCOPY AND BIOPSY: CPT | Mod: 59

## 2021-07-12 PROCEDURE — 45385 COLONOSCOPY W/LESION REMOVAL: CPT

## 2021-07-12 PROCEDURE — 43239 EGD BIOPSY SINGLE/MULTIPLE: CPT

## 2021-08-02 ENCOUNTER — NON-APPOINTMENT (OUTPATIENT)
Age: 66
End: 2021-08-02

## 2021-10-07 ENCOUNTER — APPOINTMENT (OUTPATIENT)
Dept: GASTROENTEROLOGY | Facility: CLINIC | Age: 66
End: 2021-10-07

## 2021-11-30 ENCOUNTER — APPOINTMENT (OUTPATIENT)
Dept: GASTROENTEROLOGY | Facility: CLINIC | Age: 66
End: 2021-11-30

## 2022-07-11 ENCOUNTER — APPOINTMENT (OUTPATIENT)
Dept: RADIOLOGY | Facility: IMAGING CENTER | Age: 67
End: 2022-07-11

## 2022-07-11 ENCOUNTER — OUTPATIENT (OUTPATIENT)
Dept: OUTPATIENT SERVICES | Facility: HOSPITAL | Age: 67
LOS: 1 days | End: 2022-07-11
Payer: MEDICARE

## 2022-07-11 DIAGNOSIS — Z00.8 ENCOUNTER FOR OTHER GENERAL EXAMINATION: ICD-10-CM

## 2022-07-11 PROCEDURE — 72110 X-RAY EXAM L-2 SPINE 4/>VWS: CPT | Mod: 26

## 2022-07-11 PROCEDURE — 72110 X-RAY EXAM L-2 SPINE 4/>VWS: CPT

## 2022-07-11 PROCEDURE — 73501 X-RAY EXAM HIP UNI 1 VIEW: CPT

## 2022-07-11 PROCEDURE — 73501 X-RAY EXAM HIP UNI 1 VIEW: CPT | Mod: 26,RT

## 2024-04-09 ENCOUNTER — APPOINTMENT (OUTPATIENT)
Dept: GASTROENTEROLOGY | Facility: CLINIC | Age: 69
End: 2024-04-09
Payer: MEDICARE

## 2024-04-09 VITALS
DIASTOLIC BLOOD PRESSURE: 76 MMHG | SYSTOLIC BLOOD PRESSURE: 116 MMHG | WEIGHT: 245 LBS | OXYGEN SATURATION: 95 % | HEIGHT: 72 IN | HEART RATE: 86 BPM | TEMPERATURE: 97.3 F | BODY MASS INDEX: 33.18 KG/M2

## 2024-04-09 DIAGNOSIS — K64.8 OTHER HEMORRHOIDS: ICD-10-CM

## 2024-04-09 DIAGNOSIS — K76.9 LIVER DISEASE, UNSPECIFIED: ICD-10-CM

## 2024-04-09 DIAGNOSIS — K64.4 RESIDUAL HEMORRHOIDAL SKIN TAGS: ICD-10-CM

## 2024-04-09 DIAGNOSIS — K57.30 DIVERTICULOSIS OF LARGE INTESTINE W/OUT PERFORATION OR ABSCESS W/OUT BLEEDING: ICD-10-CM

## 2024-04-09 DIAGNOSIS — R15.2 FECAL URGENCY: ICD-10-CM

## 2024-04-09 DIAGNOSIS — K75.81 NONALCOHOLIC STEATOHEPATITIS (NASH): ICD-10-CM

## 2024-04-09 DIAGNOSIS — Z86.010 PERSONAL HISTORY OF COLONIC POLYPS: ICD-10-CM

## 2024-04-09 DIAGNOSIS — R74.8 ABNORMAL LEVELS OF OTHER SERUM ENZYMES: ICD-10-CM

## 2024-04-09 PROCEDURE — 36415 COLL VENOUS BLD VENIPUNCTURE: CPT

## 2024-04-09 PROCEDURE — 99204 OFFICE O/P NEW MOD 45 MIN: CPT

## 2024-04-09 RX ORDER — POLYETHYLENE GLYCOL 3350 AND ELECTROLYTES WITH LEMON FLAVOR 236; 22.74; 6.74; 5.86; 2.97 G/4L; G/4L; G/4L; G/4L; G/4L
236 POWDER, FOR SOLUTION ORAL
Qty: 1 | Refills: 0 | Status: DISCONTINUED | COMMUNITY
Start: 2021-07-08 | End: 2024-04-09

## 2024-04-09 RX ORDER — SODIUM SULFATE, POTASSIUM SULFATE AND MAGNESIUM SULFATE 1.6; 3.13; 17.5 G/177ML; G/177ML; G/177ML
17.5-3.13-1.6 SOLUTION ORAL
Qty: 1 | Refills: 0 | Status: ACTIVE | COMMUNITY
Start: 2024-04-09 | End: 1900-01-01

## 2024-04-09 RX ORDER — ICOSAPENT ETHYL 500 MG/1
CAPSULE ORAL
Refills: 0 | Status: DISCONTINUED | COMMUNITY
End: 2024-04-09

## 2024-04-09 RX ORDER — NEBIVOLOL 5 MG/1
5 TABLET ORAL
Refills: 0 | Status: ACTIVE | COMMUNITY

## 2024-04-09 RX ORDER — AMLODIPINE BESYLATE 5 MG/1
TABLET ORAL
Refills: 0 | Status: ACTIVE | COMMUNITY

## 2024-04-09 RX ORDER — SODIUM SULFATE, POTASSIUM SULFATE, MAGNESIUM SULFATE 17.5; 3.13; 1.6 G/ML; G/ML; G/ML
17.5-3.13-1.6 SOLUTION, CONCENTRATE ORAL
Qty: 1 | Refills: 0 | Status: DISCONTINUED | COMMUNITY
Start: 2021-06-01 | End: 2024-04-09

## 2024-04-09 NOTE — ASSESSMENT
[FreeTextEntry1] : Patient with a history of adenomatous colonic polyps and diverticulosis.  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  He has chronically elevated LFTs.  He notes intermittent fecal urgency.  He has a small indeterminate liver lesion.  A colonoscopy has been scheduled. The risks, benefits, alternatives, and limitations of the procedure, including the possibility of missed lesions, were explained.  The patient will require cardiac and anesthesia clearance prior to the procedure.  Information was given to the patient regarding diverticulosis and a high-fiber diet.  We discussed this in depth.  Blood work was sent for LFTs, PT, AFP, Aguilar FibroSure testing.  Patient was sent for an MRI to reevaluate the liver.   Plan from 6/1/2021 - Patient with a history of Aguilar with fibrosis and a subcentimeter focus on previous MRI that requires follow-up.  He has a history of adenomatous colonic polyps.  He complains of excessive gas.  He has a history of a weakly positive tissue transglutaminase IgG.  Blood work was sent for LFTs, PT, alpha-fetoprotein, Aguilar FibroSure testing, and Lyme antibodies.  Patient was sent for MRI with elastography.  I advised follow-up with the hepatology team although I have not certain he will return to see them.  An EGD and colonoscopy have been scheduled. The risks, benefits, alternatives, and limitations of the procedures, including the possibility of missed lesions, were explained.  The patient will require cardiac and anesthesia clearance given the thoracic aneurysm.   Plan from 7/23/2019 - Patient with evidence of AGUILAR and significant fibrosis. He has elevated transaminases. He has an elevated SARAI with a borderline anti-smooth muscle antibody and normal IgG. It is unlikely that this represents autoimmune hepatitis although it needs to be considered. The patient has a decreased alpha-1 antitrypsin level. Again, it is unlikely that the patient has alpha-1 antitrypsin deficiency as a cause of liver disease but it needs to be considered. The patient has a weakly positive tissue transglutaminase IgG but otherwise negative celiac studies. This is not consistent with the diagnosis of celiac disease. The patient also has elevated triglycerides. Previous MRI showed a subcentimeter indeterminant focus in the liver.  Blood work was sent for one antitrypsin phenotype and genotype, celiac genetic testing, repeat SARAI, AFP, LFTs, GGTP.  Patient was sent for an MRI to followup the previously seen indeterminant focus.  Given the possibility of cirrhosis or advanced fibrosis and the slight questions as to any other liver diseases including autoimmune hepatitis and alpha-1 antitrypsin deficiency, the patient will be referred for a hepatology evaluation. He will need a fibroscan, which they will be able to perform.  The patient was advised to stay on a low fat and low carbohydrate diet and to try to lose weight and increase exercise, as well as to avoid alcohol.  The patient will discuss with his PMD regarding the possibility of starting medication for the elevated triglycerides. This has been discussed in the past.  Patient is due for a colonoscopy in July 2020 for his history of colonic polyps. I will perform an EGD at the same time to fully exclude celiac disease.  Patient will return to see me in 3 months.   Plan from 4/2/19 - Patient with elevated liver tests with an MRI showing fatty liver along with a subcentimeter indeterminate focus. He has a history of colonic polyps.  Blood work was sent for LFTs, PT, alpha-1 antitrypsin, alpha-fetoprotein, SARAI, ANCA, ceruloplasmin, iron studies, GGTP, celiac markers, hepatitis A., B., C. serologies, lipid profile, AMA, anti-smooth muscle antibody, anti-LKM antibody, anti-soluble liver antigen antibody, and AGUILAR Fibrosure.  We will repeat an MRI in August to followup the indeterminate focus seen.  The patient was advised to stay on a low fat and low carbohydrate diet and to try to lose weight and increase exercise.  Patient will return to see me in 2 months.  Patient is due for colonoscopy in July 2020.   Plan from 8/1/17 - Patient status post colonoscopy significant for tubular adenoma and mild diverticulosis. He has occasional urgency with his bowel movements but is otherwise well.  Information was given to the patient regarding diverticulosis and a high-fiber diet.  We will repeat a colonoscopy in 3 years that is July 2020.  Patient will contact me if his fecal urgency continues or worsens.

## 2024-04-09 NOTE — HISTORY OF PRESENT ILLNESS
[FreeTextEntry1] : The patient is a 68-year-old man with history of adenomatous colonic polyps as well as MASH.  He also has a small liver lesion which had been stable and thought to be benign.  He has ongoing elevated liver tests.  He was last seen in 2021 at which time blood work showed AST and ALT of 46 and 74 respectively with Aguilar FibroSure testing showing S3, N2, F3 disease.  LFTs were more recently checked on January 25, 2024 with AST and ALT of 41 and 72 respectively.  An MR elastography performed on June 30, 2021 showed diffuse moderate to severe steatosis but no significant fibrosis.  There was a stable right hepatic lesion.  The patient last underwent EGD and colonoscopy on July 12, 2021.  EGD was significant for a 1 cm hiatal hernia with no significant findings on endoscopic biopsies.  Colonoscopy was significant for removal of 2 polyps, 1 tubular adenoma and 1 hyperplastic.  The patient has moderate diverticulosis involving the sigmoid colon as well as a single diverticulum in the cecum.  The patient also has internal and external hemorrhoids which are asymptomatic.  He denies abdominal pain, heartburn, dysphagia, nausea, vomiting.  He has intermittent fecal urgency.  He has 2 solid bowel movements a day without melena or bright red blood per rectum.  The patient's weight is stable.  He complains of fatigue.  He drinks alcohol approximately every 2 weeks.  He has not been hospitalized in the past year.  He has a thoracic aneurysm which is being followed and is approaching 5 cm.   Note from 6/1/2021 - The patient has a history of Aguilar with fibrosis and a history of a subcentimeter focus in the liver on MRI performed on August 28th 2019 for which 1 year follow-up was recommended.  He saw the hematologist at that time but has not followed up and does not wish to return.  He has a history of adenomatous colonic polyps.  He also had a history of a weakly positive tissue transglutaminase IgG, which is unlikely to represent celiac disease with otherwise negative labs.  The patient complains of increased gas.  He has 3-4 bowel movements a day which are loose.  He denies melena, bright red blood per rectum, abdominal pain, heartburn, dysphagia.  The patient's weight is stable.  He has a history of a 4.7 cm thoracic aneurysm.  He also recently had a tick bite and is concerned regarding Lyme disease.  The patient has not been admitted to the hospital in the past year and denies any cardiac issues.   Note from 7/23/2019 - We spent a great deal of time going over the patient's blood work from April 2, 2019. AGUILAR Fibrosure showed S3, N2, F3 disease indicating the presence of AGUILAR with significant fibrosis. The patient's AST and ALT were elevated at 45 and 65 respectively. Alpha one antitrypsin was decreased at 72. SARAI was elevated at 1-320 with an anti-smooth muscle antibody that was borderline at 1-20. IgG was normal. Triglyceride was elevated at 323 although this was not a fasting study. Celiac markers were largely negative except for a weakly positive tissue transglutaminase IgG. Previous MRI showed a subcentimeter indeterminate focus in the liver.  The patient denies abdominal pain. His bowel movements are stable. He generally has 2 sour bowel movements a day although he has occasional diarrhea. He denies melena or bright blood per rectum. The patient's weight is stable.   Note from 4/2/19 - The patient has a history of colonic polyps and diverticulosis. He reports that he was found to have elevated liver tests and subsequently had an MRI on February 17, 2019 which showed moderate to severe hepatic steatosis along with a subcentimeter indeterminate focus in the right hepatic lobe for which six-month followup was recommended. The patient complains of fatigue but denies abdominal pain, heartburn, dysphagia. Bowel movements are normal without melena or prior blood per rectum. The patient has gained 8 pounds over the past 1-1/2 years.   Note from 8/1/17 - The patient is status post colonoscopy performed in July 13, 2017. Examination was significant for removal of a tubular adenoma, along with mild diverticulosis and internal hemorrhoids which are asymptomatic. The patient has 2 bowel movements a day which are usually solid. He does note urgency with loose stools approximately twice a week. He denies melena or bright or blood per rectum. He denies heartburn or dysphagia. His weight is stable. Review of systems is negative.  Note from 6/12/17 - Patient has a history of colonic polyps. He had multiple polyps removed on his previous colonoscopy. He feels well denying abdominal pain or chest pain. His stools are occasionally loose. He denies melena or bright red blood per rectum. Patient does not have heartburn or dysphagia. His weight is stable.  The patient has a 4.7 cm thoracic aneurysm which is followed by cardiology and cardiothoracic surgery. The patient has not been hospitalized in the past year and denies any other cardiac issues.

## 2024-04-09 NOTE — CONSULT LETTER
[FreeTextEntry1] : Dear Dr. Macias and Dr. Clarence Mike,  I had the pleasure of seeing your patient GUNJAN LOYD in the office today.  My office note is attached. PLEASE READ THE "ASSESSMENT" SECTION OF THE NOTE TO SEE MY IMPRESSION AND PLAN.  Thank you very much for allowing me to participate in the care of your patient.  Sincerely,  Ty Pierre M.D., FACG, FACP Director, Celiac Program at [facility]  of Medicine, [employer] [facility] [employer] at [facility] Adjunct  of Medicine, [facility] of [facility] Practice Director, [facility] - Gastroenterology at [address] [place] [address] Tel: [phone] Email: [email or web address]   The attached note has been created using a voice recognition system (Dragon).  There may be some misspellings and typos.  Please call my office if you have any issues or questions.

## 2024-04-12 LAB
AFP-TM SERPL-MCNC: 3 NG/ML
ALBUMIN SERPL ELPH-MCNC: 4.9 G/DL
ALP BLD-CCNC: 89 U/L
ALT SERPL-CCNC: 73 U/L
AST SERPL W P-5'-P-CCNC: 44 IU/L
AST SERPL-CCNC: 39 U/L
BILIRUB DIRECT SERPL-MCNC: 0.2 MG/DL
BILIRUB INDIRECT SERPL-MCNC: 0.5 MG/DL
BILIRUB SERPL-MCNC: 0.7 MG/DL
CHOLEST SERPL-MCNC: 164 MG/DL
COMMENT:: NORMAL
FIBROSIS STAGE SERPL QL: NORMAL
FIBROSURE ALPHA 2 MACROGLOBULINS: 412 MG/DL
FIBROSURE ALT (SGPT): 80 IU/L
FIBROSURE APOLIPOPROTEIN A1: 129 MG/DL
FIBROSURE GGT: 48 IU/L
FIBROSURE HAPTOGLOBIN: 130 MG/DL
FIBROSURE SCORING: NORMAL
FIBROSURE TOTAL BILIRUBIN: 0.6 MG/DL
GGT SERPL-CCNC: 50 U/L
GLUCOSE SERPL-MCNC: 101 MG/DL
INR PPP: 1 RATIO
INTERPRETATIONS:: NORMAL
LIVER FIBR SCORE SERPL CALC.FIBROSURE: 0.77
Lab: NORMAL
NASH SCORING: NORMAL
NECROINFLAMMATORY ACT GRADE SERPL QL: NORMAL
NECROINFLAMMATORY ACT SCORE SERPL: 0.89
PROT SERPL-MCNC: 7.5 G/DL
PT BLD: 11.3 SEC
SERVICE CMNT-IMP: NORMAL
STEATOSIS GRADE: NORMAL
STEATOSIS SCORE: 0.62
STEATOSIS SCORING: NORMAL
TRIGL SERPL-MCNC: 265 MG/DL

## 2024-08-15 ENCOUNTER — APPOINTMENT (OUTPATIENT)
Dept: GASTROENTEROLOGY | Facility: AMBULATORY MEDICAL SERVICES | Age: 69
End: 2024-08-15
Payer: MEDICARE

## 2024-08-15 PROCEDURE — 45385 COLONOSCOPY W/LESION REMOVAL: CPT | Mod: PT

## 2024-08-15 PROCEDURE — 45380 COLONOSCOPY AND BIOPSY: CPT | Mod: PT,59

## 2024-11-06 ENCOUNTER — APPOINTMENT (OUTPATIENT)
Dept: GASTROENTEROLOGY | Facility: CLINIC | Age: 69
End: 2024-11-06
Payer: MEDICARE

## 2024-11-06 VITALS
HEIGHT: 72 IN | OXYGEN SATURATION: 97 % | SYSTOLIC BLOOD PRESSURE: 130 MMHG | TEMPERATURE: 97.1 F | BODY MASS INDEX: 31.29 KG/M2 | HEART RATE: 63 BPM | DIASTOLIC BLOOD PRESSURE: 70 MMHG | WEIGHT: 231 LBS

## 2024-11-06 DIAGNOSIS — D12.6 BENIGN NEOPLASM OF COLON, UNSPECIFIED: ICD-10-CM

## 2024-11-06 DIAGNOSIS — K57.30 DIVERTICULOSIS OF LARGE INTESTINE W/OUT PERFORATION OR ABSCESS W/OUT BLEEDING: ICD-10-CM

## 2024-11-06 DIAGNOSIS — K75.81 NONALCOHOLIC STEATOHEPATITIS (NASH): ICD-10-CM

## 2024-11-06 DIAGNOSIS — Z78.9 OTHER SPECIFIED HEALTH STATUS: ICD-10-CM

## 2024-11-06 DIAGNOSIS — K64.8 OTHER HEMORRHOIDS: ICD-10-CM

## 2024-11-06 DIAGNOSIS — K76.9 LIVER DISEASE, UNSPECIFIED: ICD-10-CM

## 2024-11-06 DIAGNOSIS — K64.4 RESIDUAL HEMORRHOIDAL SKIN TAGS: ICD-10-CM

## 2024-11-06 PROCEDURE — 99214 OFFICE O/P EST MOD 30 MIN: CPT

## 2024-11-06 RX ORDER — UBIDECARENONE 50 MG
CAPSULE ORAL
Refills: 0 | Status: ACTIVE | COMMUNITY

## 2024-11-29 ENCOUNTER — APPOINTMENT (OUTPATIENT)
Dept: MRI IMAGING | Facility: IMAGING CENTER | Age: 69
End: 2024-11-29

## 2024-11-29 ENCOUNTER — RESULT REVIEW (OUTPATIENT)
Age: 69
End: 2024-11-29

## 2024-11-29 ENCOUNTER — APPOINTMENT (OUTPATIENT)
Dept: CT IMAGING | Facility: IMAGING CENTER | Age: 69
End: 2024-11-29

## 2024-11-29 ENCOUNTER — OUTPATIENT (OUTPATIENT)
Dept: OUTPATIENT SERVICES | Facility: HOSPITAL | Age: 69
LOS: 1 days | End: 2024-11-29
Payer: MEDICARE

## 2024-11-29 DIAGNOSIS — K64.8 OTHER HEMORRHOIDS: ICD-10-CM

## 2024-11-29 PROCEDURE — 74183 MRI ABD W/O CNTR FLWD CNTR: CPT | Mod: 26,MH

## 2024-11-29 PROCEDURE — 74183 MRI ABD W/O CNTR FLWD CNTR: CPT

## 2024-11-29 PROCEDURE — 71250 CT THORAX DX C-: CPT | Mod: 26,MH

## 2024-11-29 PROCEDURE — 71250 CT THORAX DX C-: CPT | Mod: MH

## 2024-11-29 PROCEDURE — 76391 MR ELASTOGRAPHY: CPT

## 2024-11-29 PROCEDURE — 76391 MR ELASTOGRAPHY: CPT | Mod: 26

## 2024-11-29 PROCEDURE — A9585: CPT

## 2025-01-17 ENCOUNTER — APPOINTMENT (OUTPATIENT)
Dept: CARDIOTHORACIC SURGERY | Facility: CLINIC | Age: 70
End: 2025-01-17
Payer: MEDICARE

## 2025-01-17 VITALS
OXYGEN SATURATION: 95 % | SYSTOLIC BLOOD PRESSURE: 128 MMHG | DIASTOLIC BLOOD PRESSURE: 78 MMHG | HEART RATE: 67 BPM | RESPIRATION RATE: 16 BRPM | TEMPERATURE: 98.2 F | BODY MASS INDEX: 31.97 KG/M2 | HEIGHT: 72 IN | WEIGHT: 236 LBS

## 2025-01-17 DIAGNOSIS — I71.20 THORACIC AORTIC ANEURYSM, WITHOUT RUPTURE, UNSPECIFIED: ICD-10-CM

## 2025-01-17 DIAGNOSIS — Z78.9 OTHER SPECIFIED HEALTH STATUS: ICD-10-CM

## 2025-01-17 PROCEDURE — 99204 OFFICE O/P NEW MOD 45 MIN: CPT

## 2025-01-21 ENCOUNTER — APPOINTMENT (OUTPATIENT)
Dept: HEPATOLOGY | Facility: CLINIC | Age: 70
End: 2025-01-21
Payer: MEDICARE

## 2025-01-21 VITALS
BODY MASS INDEX: 31.97 KG/M2 | OXYGEN SATURATION: 97 % | HEART RATE: 65 BPM | SYSTOLIC BLOOD PRESSURE: 126 MMHG | DIASTOLIC BLOOD PRESSURE: 70 MMHG | TEMPERATURE: 97.1 F | HEIGHT: 72 IN | WEIGHT: 236 LBS

## 2025-01-21 DIAGNOSIS — K76.0 FATTY (CHANGE OF) LIVER, NOT ELSEWHERE CLASSIFIED: ICD-10-CM

## 2025-01-21 PROCEDURE — 99214 OFFICE O/P EST MOD 30 MIN: CPT

## 2025-01-31 ENCOUNTER — OUTPATIENT (OUTPATIENT)
Dept: OUTPATIENT SERVICES | Facility: HOSPITAL | Age: 70
LOS: 1 days | End: 2025-01-31
Payer: MEDICARE

## 2025-01-31 ENCOUNTER — RESULT REVIEW (OUTPATIENT)
Age: 70
End: 2025-01-31

## 2025-01-31 ENCOUNTER — APPOINTMENT (OUTPATIENT)
Dept: CT IMAGING | Facility: CLINIC | Age: 70
End: 2025-01-31

## 2025-01-31 DIAGNOSIS — I71.20 THORACIC AORTIC ANEURYSM, WITHOUT RUPTURE, UNSPECIFIED: ICD-10-CM

## 2025-01-31 PROCEDURE — 71275 CT ANGIOGRAPHY CHEST: CPT | Mod: 26

## 2025-01-31 PROCEDURE — 71275 CT ANGIOGRAPHY CHEST: CPT

## 2025-01-31 PROCEDURE — 93306 TTE W/DOPPLER COMPLETE: CPT | Mod: 26

## 2025-01-31 PROCEDURE — 93306 TTE W/DOPPLER COMPLETE: CPT

## 2025-02-20 ENCOUNTER — NON-APPOINTMENT (OUTPATIENT)
Age: 70
End: 2025-02-20

## 2025-02-28 ENCOUNTER — APPOINTMENT (OUTPATIENT)
Dept: CARDIOTHORACIC SURGERY | Facility: CLINIC | Age: 70
End: 2025-02-28
Payer: MEDICARE

## 2025-02-28 VITALS
OXYGEN SATURATION: 95 % | DIASTOLIC BLOOD PRESSURE: 93 MMHG | SYSTOLIC BLOOD PRESSURE: 139 MMHG | HEIGHT: 72 IN | WEIGHT: 238 LBS | HEART RATE: 60 BPM | BODY MASS INDEX: 32.23 KG/M2 | RESPIRATION RATE: 15 BRPM

## 2025-02-28 DIAGNOSIS — I71.20 THORACIC AORTIC ANEURYSM, WITHOUT RUPTURE, UNSPECIFIED: ICD-10-CM

## 2025-02-28 PROCEDURE — 99214 OFFICE O/P EST MOD 30 MIN: CPT

## 2025-06-03 ENCOUNTER — APPOINTMENT (OUTPATIENT)
Dept: HEPATOLOGY | Facility: CLINIC | Age: 70
End: 2025-06-03
Payer: MEDICARE

## 2025-06-03 VITALS
TEMPERATURE: 97.1 F | WEIGHT: 238 LBS | OXYGEN SATURATION: 96 % | DIASTOLIC BLOOD PRESSURE: 76 MMHG | HEIGHT: 72 IN | SYSTOLIC BLOOD PRESSURE: 110 MMHG | HEART RATE: 83 BPM | BODY MASS INDEX: 32.23 KG/M2

## 2025-06-03 DIAGNOSIS — K76.0 FATTY (CHANGE OF) LIVER, NOT ELSEWHERE CLASSIFIED: ICD-10-CM

## 2025-06-03 LAB
ALBUMIN SERPL ELPH-MCNC: 4.9 G/DL
ALP BLD-CCNC: 103 U/L
ALT SERPL-CCNC: 63 U/L
AST SERPL-CCNC: 35 U/L
BILIRUB DIRECT SERPL-MCNC: 0.24 MG/DL
BILIRUB INDIRECT SERPL-MCNC: 0.6 MG/DL
BILIRUB SERPL-MCNC: 0.8 MG/DL
PROT SERPL-MCNC: 7.3 G/DL

## 2025-06-03 PROCEDURE — 99214 OFFICE O/P EST MOD 30 MIN: CPT

## 2025-06-04 LAB
ESTIMATED AVERAGE GLUCOSE: 134 MG/DL
HBA1C MFR BLD HPLC: 6.3 %

## 2025-06-23 ENCOUNTER — NON-APPOINTMENT (OUTPATIENT)
Age: 70
End: 2025-06-23